# Patient Record
Sex: MALE | Race: OTHER | Employment: OTHER | URBAN - METROPOLITAN AREA
[De-identification: names, ages, dates, MRNs, and addresses within clinical notes are randomized per-mention and may not be internally consistent; named-entity substitution may affect disease eponyms.]

---

## 2022-08-09 NOTE — PATIENT DISCUSSION
The patient expressed a desire to see through the full range of vision from distance, to middle, to near without glasses. The limitations of advanced lens technology were reviewed. Side effects, specifically halos, reduced contrast etc. were discussed.

## 2022-11-29 ENCOUNTER — CONSULTATION/EVALUATION (OUTPATIENT)
Dept: URBAN - METROPOLITAN AREA CLINIC 39 | Facility: CLINIC | Age: 77
End: 2022-11-29

## 2022-11-29 DIAGNOSIS — H25.813: ICD-10-CM

## 2022-11-29 PROCEDURE — 92025AV CORNEAL TOPOGRAPHY, AV

## 2022-11-29 PROCEDURE — 99214 OFFICE O/P EST MOD 30 MIN: CPT

## 2022-11-29 PROCEDURE — 92136TC INTERFEROMETRY - TECHNICAL COMPONENT

## 2022-11-29 PROCEDURE — V2799PMN IMPRIMIS PRED-MOXI-NEPAF 5ML

## 2022-11-29 ASSESSMENT — VISUAL ACUITY
OS_SC: J1
OD_SC: 20/400
OS_BAT: 20/50
OD_SC: J1
OD_BAT: 20/60
OS_CC: 20/25
OS_AM: 20/20
OD_RAM: 20/20
OD_CC: 20/40
OS_SC: 20/400

## 2022-11-29 ASSESSMENT — TONOMETRY
OS_IOP_MMHG: 12
OD_IOP_MMHG: 12

## 2023-01-09 ENCOUNTER — POST-OP (OUTPATIENT)
Dept: URBAN - METROPOLITAN AREA CLINIC 39 | Facility: CLINIC | Age: 78
End: 2023-01-09

## 2023-01-09 ENCOUNTER — SURGERY/PROCEDURE (OUTPATIENT)
Dept: URBAN - METROPOLITAN AREA CLINIC 39 | Facility: CLINIC | Age: 78
End: 2023-01-09

## 2023-01-09 ENCOUNTER — PRE-OP/H&P (OUTPATIENT)
Dept: URBAN - METROPOLITAN AREA CLINIC 39 | Facility: CLINIC | Age: 78
End: 2023-01-09

## 2023-01-09 DIAGNOSIS — Z96.1: ICD-10-CM

## 2023-01-09 DIAGNOSIS — H25.813: ICD-10-CM

## 2023-01-09 PROCEDURE — 65772LRI LRI DURING CAT SX

## 2023-01-09 PROCEDURE — 66999LNSR LENSAR LASER FOR CAT SX

## 2023-01-09 PROCEDURE — 6698454AV REMOVE CATARACT, INSERT ADVANCED LENS (SX ONLY)

## 2023-01-09 PROCEDURE — 99211T TECH SERVICE

## 2023-01-09 ASSESSMENT — TONOMETRY
OD_IOP_MMHG: 16
OS_IOP_MMHG: 14

## 2023-01-09 ASSESSMENT — VISUAL ACUITY: OD_SC: 20/40

## 2023-01-16 ENCOUNTER — SURGERY/PROCEDURE (OUTPATIENT)
Dept: URBAN - METROPOLITAN AREA CLINIC 39 | Facility: CLINIC | Age: 78
End: 2023-01-16

## 2023-01-16 ENCOUNTER — POST OP/EVAL OF SECOND EYE (OUTPATIENT)
Dept: URBAN - METROPOLITAN AREA CLINIC 39 | Facility: CLINIC | Age: 78
End: 2023-01-16

## 2023-01-16 ENCOUNTER — POST-OP (OUTPATIENT)
Dept: URBAN - METROPOLITAN AREA CLINIC 39 | Facility: CLINIC | Age: 78
End: 2023-01-16

## 2023-01-16 DIAGNOSIS — Z96.1: ICD-10-CM

## 2023-01-16 DIAGNOSIS — H04.123: ICD-10-CM

## 2023-01-16 DIAGNOSIS — Z79.899: ICD-10-CM

## 2023-01-16 DIAGNOSIS — H25.812: ICD-10-CM

## 2023-01-16 PROCEDURE — P6698455 NON-COMANAGED ADVANCED PO

## 2023-01-16 PROCEDURE — 66999LNSR LENSAR LASER FOR CAT SX

## 2023-01-16 PROCEDURE — 65772LRI LRI DURING CAT SX

## 2023-01-16 PROCEDURE — 99211T TECH SERVICE

## 2023-01-16 PROCEDURE — 92012 INTRM OPH EXAM EST PATIENT: CPT

## 2023-01-16 PROCEDURE — 6698454AV REMOVE CATARACT, INSERT ADVANCED LENS (SX ONLY)

## 2023-01-16 ASSESSMENT — TONOMETRY
OD_IOP_MMHG: 16
OS_IOP_MMHG: 11
OS_IOP_MMHG: 14
OD_IOP_MMHG: 12

## 2023-01-16 ASSESSMENT — VISUAL ACUITY
OS_SC: 20/400
OD_SC: 20/20
OD_SC: J1
OS_BAT: 20/50
OD: J3
OS_SC: 20/30-2
OD_SC: 20/20
OS_SC: J1
OS_AM: 20/20